# Patient Record
(demographics unavailable — no encounter records)

---

## 2025-06-13 NOTE — PHYSICAL EXAM
[FreeTextEntry1] : snoring, RENATA [] : septum deviated bilaterally [de-identified] : Hypertrophy. Fracture line on the left.  [Midline] : trachea located in midline position [Normal] : no rashes

## 2025-06-13 NOTE — CONSULT LETTER
[Dear  ___] : Dear  [unfilled], [Courtesy Letter:] : I had the pleasure of seeing your patient, [unfilled], in my office today. [Please see my note below.] : Please see my note below. [Consult Closing:] : Thank you very much for allowing me to participate in the care of this patient.  If you have any questions, please do not hesitate to contact me. [Sincerely,] : Sincerely, [FreeTextEntry2] : Dr. Sri Cortes, [FreeTextEntry3] : Shan Whaley MD, MAUREEN, FACS  Department Otolaryngology Director of [facility] Professor of Otolaryngology,  Anjana Mcmanus/[facility] [employer] [facility]

## 2025-06-13 NOTE — ADDENDUM
[FreeTextEntry1] : Scribe Attestation: I, Ricky Espinosa, am scribing for and in the presence of Dr. Shan Whaley in the following sections HISTORY OF PRESENT ILLNESS, PAST MEDICAL/FAMILY/SOCIAL HISTORY; REVIEW OF SYSTEMS; VITAL SIGNS; PHYSICAL EXAM; PROCEDURES; DISCUSSION.   I, Dr. Shan Whaley, personally performed the services described in the documentation, reviewed the documentation recorded by the scribe in my presence, and it accurately and completely records my words and actions.

## 2025-06-13 NOTE — PROCEDURE
[Topical Lidocaine] : topical lidocaine [Oxymetazoline HCl] : oxymetazoline HCl [Flexible Endoscope] : examined with the flexible endoscope [Serial Number: ___] : Serial Number: [unfilled] [Image(s) Captured] : image(s) captured and filed [Video Captured] : video captured and filed [Unable to Cooperate with Mirror] : patient unable to cooperate with mirror [Complicated Symptoms] : complicated symptoms requiring more thorough examination than provided by mirror [Normal] : posterior cricoid area had healthy pink mucosa in the interarytenoid area and the esophageal inlet [de-identified] : Patient was placed in the examination chair in a sitting position. The nose was decongested with oxymetazoline nasal solution. The airway was anesthetized with 4% Xylocaine.  The back of the throat was anesthetized with Cetacaine. Direct flexible/rigid video endoscopy was performed. Findings revealed: bilaterally deviated septum with a fracture line on the left, turbinate hypertrophy. Positive Anna maneuver. Thick base of tongue. Larynx epiglottis, and vocal cords are normal.  [FreeTextEntry4] : +  + Anna maneuver [de-identified] : thick

## 2025-06-13 NOTE — HISTORY OF PRESENT ILLNESS
[de-identified] : 55 year old man here for initial evaluation of snoring. Referred by Dr. Vinicius Lunsford (cardio) Patient had sleep study conducted on 6/20/24 (with [facility]) showing an AHI of 27.2 with central and mixed apneas less than 25% of the total apneas. Patient has a BMI of 26.8 Patient has tried a CPAP machine without relief.  Complains of removing mask during sleep Patient has tried various types of masks without relief States mask cause skin irritation and dry mouth States has tried using a dental appliance without relief Patient has sleeping partner who complains about the noise of CPAP machine, stating it keeps them awake at night. Patient states tried losing weight, approximately 15 lbs. in the past with no relief from sleep apnea Patient comorbidities include weight gain, hypertension, diabetes. [Snoring] : snoring [Witnessed Apneas] : witnessed sleep apnea [Frequent Nocturnal Awakening] : no nocturnal awakening [Daytime Somnolence] : no daytime somnolence [Unintentional Sleep while Active] : no unintentional sleep while active [Unintentional Sleep While Inactive] : no unintentional sleep while inactive [Awakes Unrefreshed] : does not awake unrefreshed [Awakes with Headache] : no headache upon awakening [Awakening With Dry Mouth] : awakening with dry mouth [Recent  Weight Gain] : no recent weight gain